# Patient Record
Sex: FEMALE
[De-identification: names, ages, dates, MRNs, and addresses within clinical notes are randomized per-mention and may not be internally consistent; named-entity substitution may affect disease eponyms.]

---

## 2022-02-22 PROBLEM — Z00.00 ENCOUNTER FOR PREVENTIVE HEALTH EXAMINATION: Status: ACTIVE | Noted: 2022-02-22

## 2022-03-21 ENCOUNTER — APPOINTMENT (OUTPATIENT)
Dept: OBGYN | Facility: CLINIC | Age: 37
End: 2022-03-21
Payer: COMMERCIAL

## 2022-03-21 VITALS
DIASTOLIC BLOOD PRESSURE: 68 MMHG | SYSTOLIC BLOOD PRESSURE: 110 MMHG | HEIGHT: 67 IN | WEIGHT: 175 LBS | BODY MASS INDEX: 27.47 KG/M2

## 2022-03-21 DIAGNOSIS — Z01.419 ENCOUNTER FOR GYNECOLOGICAL EXAMINATION (GENERAL) (ROUTINE) W/OUT ABNORMAL FINDINGS: ICD-10-CM

## 2022-03-21 PROCEDURE — 99385 PREV VISIT NEW AGE 18-39: CPT

## 2022-03-21 NOTE — PHYSICAL EXAM
[Appropriately responsive] : appropriately responsive [Alert] : alert [No Acute Distress] : no acute distress [Soft] : soft [Non-tender] : non-tender [Non-distended] : non-distended [No HSM] : No HSM [No Lesions] : no lesions [No Mass] : no mass [Oriented x3] : oriented x3 [Examination Of The Breasts] : a normal appearance [No Masses] : no breast masses were palpable [Labia Majora] : normal [Labia Minora] : normal [Normal] : normal [Uterine Adnexae] : normal [No Lymphadenopathy] : no lymphadenopathy

## 2022-03-23 LAB — HPV HIGH+LOW RISK DNA PNL CVX: NOT DETECTED

## 2022-03-28 LAB — CYTOLOGY CVX/VAG DOC THIN PREP: NORMAL

## 2022-04-04 ENCOUNTER — TRANSCRIPTION ENCOUNTER (OUTPATIENT)
Age: 37
End: 2022-04-04

## 2022-04-19 NOTE — END OF VISIT
[FreeTextEntry3] : I, Venecia Pederson, acted as a scribe on behalf for Dr. Sunni Middleton on 03/21/2022.\par \par All medical entries made by the scribe were at my, Dr. Sunni Middleton, direction and personally dictated by me on 03/21/2022. I have reviewed the chart and agree that the record accurately reflects my personal performance of the history, physical exam, assessment, and plan. I have personally directed, reviewed, and agreed with the chart.

## 2022-04-19 NOTE — PLAN
[FreeTextEntry1] : 35 y/o G0 LMP 3/1/22, family planning\par \par Preconception counseling\par -begin taking PNVs\par -discussed cyclic tracking with timed intercourse every other day around ovulation day\par \par Annual\par -pap done today\par -f/u PCP for annual and appropriate immunizations\par -rto 1 year.

## 2022-04-19 NOTE — HISTORY OF PRESENT ILLNESS
[Normal Amount/Duration] :  normal amount and duration [Regular Cycle Intervals] : periods have been regular [Frequency: Q ___ days] : menstrual periods occur approximately every [unfilled] days [Menarche Age: ____] : age at menarche was [unfilled] [PapSmeardate] : 2019 [FreeTextEntry1] : 3/1/2022